# Patient Record
Sex: FEMALE | Race: WHITE | NOT HISPANIC OR LATINO | Employment: FULL TIME | URBAN - METROPOLITAN AREA
[De-identification: names, ages, dates, MRNs, and addresses within clinical notes are randomized per-mention and may not be internally consistent; named-entity substitution may affect disease eponyms.]

---

## 2022-06-28 ENCOUNTER — HOSPITAL ENCOUNTER (EMERGENCY)
Facility: HOSPITAL | Age: 37
Discharge: HOME | End: 2022-06-29
Attending: EMERGENCY MEDICINE
Payer: COMMERCIAL

## 2022-06-28 DIAGNOSIS — R10.9 ABDOMINAL PAIN: Primary | ICD-10-CM

## 2022-06-28 LAB
ALBUMIN SERPL BCP-MCNC: 3.9 G/DL (ref 3.5–5)
ALP SERPL-CCNC: 61 U/L (ref 46–116)
ALT SERPL W P-5'-P-CCNC: 48 U/L (ref 12–78)
ANION GAP SERPL CALCULATED.3IONS-SCNC: 6 MMOL/L (ref 4–13)
AST SERPL W P-5'-P-CCNC: 32 U/L (ref 5–45)
BASOPHILS # BLD AUTO: 0.02 THOUSANDS/ΜL (ref 0–0.1)
BASOPHILS NFR BLD AUTO: 0 % (ref 0–1)
BILIRUB SERPL-MCNC: 0.26 MG/DL (ref 0.2–1)
BILIRUB UR QL STRIP: NEGATIVE
BUN SERPL-MCNC: 18 MG/DL (ref 5–25)
CALCIUM SERPL-MCNC: 9.4 MG/DL (ref 8.3–10.1)
CHLORIDE SERPL-SCNC: 102 MMOL/L (ref 100–108)
CLARITY UR: CLEAR
CO2 SERPL-SCNC: 30 MMOL/L (ref 21–32)
COLOR UR: YELLOW
CREAT SERPL-MCNC: 0.91 MG/DL (ref 0.6–1.3)
EOSINOPHIL # BLD AUTO: 0.14 THOUSAND/ΜL (ref 0–0.61)
EOSINOPHIL NFR BLD AUTO: 2 % (ref 0–6)
ERYTHROCYTE [DISTWIDTH] IN BLOOD BY AUTOMATED COUNT: 13.4 % (ref 11.6–15.1)
EXT PREG TEST URINE: NEGATIVE
EXT. CONTROL ED NAV: NORMAL
GFR SERPL CREATININE-BSD FRML MDRD: 80 ML/MIN/1.73SQ M
GLUCOSE SERPL-MCNC: 90 MG/DL (ref 65–140)
GLUCOSE UR STRIP-MCNC: NEGATIVE MG/DL
HCT VFR BLD AUTO: 41.1 % (ref 34.8–46.1)
HGB BLD-MCNC: 13.2 G/DL (ref 11.5–15.4)
HGB UR QL STRIP.AUTO: NEGATIVE
IMM GRANULOCYTES # BLD AUTO: 0.04 THOUSAND/UL (ref 0–0.2)
IMM GRANULOCYTES NFR BLD AUTO: 1 % (ref 0–2)
KETONES UR STRIP-MCNC: NEGATIVE MG/DL
LEUKOCYTE ESTERASE UR QL STRIP: NEGATIVE
LIPASE SERPL-CCNC: 168 U/L (ref 73–393)
LYMPHOCYTES # BLD AUTO: 3.34 THOUSANDS/ΜL (ref 0.6–4.47)
LYMPHOCYTES NFR BLD AUTO: 43 % (ref 14–44)
MCH RBC QN AUTO: 31.1 PG (ref 26.8–34.3)
MCHC RBC AUTO-ENTMCNC: 32.1 G/DL (ref 31.4–37.4)
MCV RBC AUTO: 97 FL (ref 82–98)
MONOCYTES # BLD AUTO: 0.57 THOUSAND/ΜL (ref 0.17–1.22)
MONOCYTES NFR BLD AUTO: 7 % (ref 4–12)
NEUTROPHILS # BLD AUTO: 3.64 THOUSANDS/ΜL (ref 1.85–7.62)
NEUTS SEG NFR BLD AUTO: 47 % (ref 43–75)
NITRITE UR QL STRIP: NEGATIVE
NRBC BLD AUTO-RTO: 0 /100 WBCS
PH UR STRIP.AUTO: 7 [PH]
PLATELET # BLD AUTO: 344 THOUSANDS/UL (ref 149–390)
PMV BLD AUTO: 10 FL (ref 8.9–12.7)
POTASSIUM SERPL-SCNC: 4.7 MMOL/L (ref 3.5–5.3)
PROT SERPL-MCNC: 8 G/DL (ref 6.4–8.2)
PROT UR STRIP-MCNC: NEGATIVE MG/DL
RBC # BLD AUTO: 4.25 MILLION/UL (ref 3.81–5.12)
SODIUM SERPL-SCNC: 138 MMOL/L (ref 136–145)
SP GR UR STRIP.AUTO: <=1.005 (ref 1–1.03)
UROBILINOGEN UR QL STRIP.AUTO: 0.2 E.U./DL
WBC # BLD AUTO: 7.75 THOUSAND/UL (ref 4.31–10.16)

## 2022-06-28 PROCEDURE — 85025 COMPLETE CBC W/AUTO DIFF WBC: CPT | Performed by: EMERGENCY MEDICINE

## 2022-06-28 PROCEDURE — 81025 URINE PREGNANCY TEST: CPT

## 2022-06-28 PROCEDURE — 96361 HYDRATE IV INFUSION ADD-ON: CPT

## 2022-06-28 PROCEDURE — 99284 EMERGENCY DEPT VISIT MOD MDM: CPT

## 2022-06-28 PROCEDURE — 83690 ASSAY OF LIPASE: CPT | Performed by: EMERGENCY MEDICINE

## 2022-06-28 PROCEDURE — 81003 URINALYSIS AUTO W/O SCOPE: CPT

## 2022-06-28 PROCEDURE — 80053 COMPREHEN METABOLIC PANEL: CPT | Performed by: EMERGENCY MEDICINE

## 2022-06-28 PROCEDURE — 96374 THER/PROPH/DIAG INJ IV PUSH: CPT

## 2022-06-28 PROCEDURE — 36415 COLL VENOUS BLD VENIPUNCTURE: CPT | Performed by: EMERGENCY MEDICINE

## 2022-06-28 RX ORDER — ONDANSETRON 2 MG/ML
4 INJECTION INTRAMUSCULAR; INTRAVENOUS ONCE
Status: COMPLETED | OUTPATIENT
Start: 2022-06-28 | End: 2022-06-28

## 2022-06-28 RX ADMIN — SODIUM CHLORIDE 1000 ML: 0.9 INJECTION, SOLUTION INTRAVENOUS at 23:23

## 2022-06-28 RX ADMIN — ONDANSETRON 4 MG: 2 INJECTION INTRAMUSCULAR; INTRAVENOUS at 23:23

## 2022-06-29 ENCOUNTER — APPOINTMENT (EMERGENCY)
Dept: CT IMAGING | Facility: HOSPITAL | Age: 37
End: 2022-06-29
Payer: COMMERCIAL

## 2022-06-29 ENCOUNTER — APPOINTMENT (EMERGENCY)
Dept: ULTRASOUND IMAGING | Facility: HOSPITAL | Age: 37
End: 2022-06-29
Payer: COMMERCIAL

## 2022-06-29 VITALS
RESPIRATION RATE: 16 BRPM | HEART RATE: 66 BPM | HEIGHT: 61 IN | WEIGHT: 100 LBS | OXYGEN SATURATION: 100 % | BODY MASS INDEX: 18.88 KG/M2 | TEMPERATURE: 98.3 F | SYSTOLIC BLOOD PRESSURE: 123 MMHG | DIASTOLIC BLOOD PRESSURE: 71 MMHG

## 2022-06-29 PROCEDURE — 76856 US EXAM PELVIC COMPLETE: CPT

## 2022-06-29 PROCEDURE — 96361 HYDRATE IV INFUSION ADD-ON: CPT

## 2022-06-29 PROCEDURE — 96375 TX/PRO/DX INJ NEW DRUG ADDON: CPT

## 2022-06-29 PROCEDURE — 99284 EMERGENCY DEPT VISIT MOD MDM: CPT | Performed by: EMERGENCY MEDICINE

## 2022-06-29 PROCEDURE — 74176 CT ABD & PELVIS W/O CONTRAST: CPT

## 2022-06-29 PROCEDURE — 76830 TRANSVAGINAL US NON-OB: CPT

## 2022-06-29 RX ORDER — DICYCLOMINE HCL 20 MG
20 TABLET ORAL 2 TIMES DAILY
Qty: 20 TABLET | Refills: 0 | Status: SHIPPED | OUTPATIENT
Start: 2022-06-29

## 2022-06-29 RX ORDER — KETOROLAC TROMETHAMINE 30 MG/ML
15 INJECTION, SOLUTION INTRAMUSCULAR; INTRAVENOUS ONCE
Status: COMPLETED | OUTPATIENT
Start: 2022-06-29 | End: 2022-06-29

## 2022-06-29 RX ORDER — ONDANSETRON 4 MG/1
4 TABLET, ORALLY DISINTEGRATING ORAL EVERY 6 HOURS PRN
Qty: 20 TABLET | Refills: 0 | Status: SHIPPED | OUTPATIENT
Start: 2022-06-29

## 2022-06-29 RX ORDER — IBUPROFEN 800 MG/1
800 TABLET ORAL 3 TIMES DAILY
Qty: 21 TABLET | Refills: 0 | Status: SHIPPED | OUTPATIENT
Start: 2022-06-29

## 2022-06-29 RX ADMIN — KETOROLAC TROMETHAMINE 15 MG: 30 INJECTION, SOLUTION INTRAMUSCULAR at 02:41

## 2022-07-07 NOTE — ED PROVIDER NOTES
History  Chief Complaint   Patient presents with    Abdominal Pain     LLQ pain, seen in [place], dx with cyst and PID  Pt from [facility] recovery  Reports tonight pain returned  Took ibuprofen w/o relief  24-year-old female presenting to the emergency department for evaluation of abdominal pain  Patient describes about 1-2 weeks of left lower quadrant abdominal pain, persistent since onset, had been seen in [place], was diagnosed with ovarian cyst and PID, patient states she was treated for this  Patient has since moved to this area for substance abuse rehab, states the pain has persisted, associated with some nausea, took some ibuprofen which did not help  Denies diarrhea, denies vomiting, denies vaginal bleeding or discharge  None       Past Medical History:   Diagnosis Date    Anxiety     Depression     Ovarian cyst     PID (pelvic inflammatory disease)     PTSD (post-traumatic stress disorder)        History reviewed  No pertinent surgical history  History reviewed  No pertinent family history  I have reviewed and agree with the history as documented  E-Cigarette/Vaping    E-Cigarette Use Never User      E-Cigarette/Vaping Substances     Social History     Tobacco Use    Smoking status: Never Smoker    Smokeless tobacco: Never Used   Vaping Use    Vaping Use: Never used   Substance Use Topics    Alcohol use: Never    Drug use: Yes     Types: "Crack" cocaine, Cocaine     Comment: IV and smoking, currently at [address]- ast use 6/18/22       Review of Systems   Constitutional: Negative for appetite change, chills, fatigue and fever  HENT: Negative for sneezing and sore throat  Eyes: Negative for visual disturbance  Respiratory: Negative for cough, choking, chest tightness, shortness of breath and wheezing  Cardiovascular: Negative for chest pain and palpitations  Gastrointestinal: Positive for abdominal pain and nausea   Negative for constipation, diarrhea and vomiting  Genitourinary: Negative for difficulty urinating and dysuria  Neurological: Negative for dizziness, weakness, light-headedness, numbness and headaches  All other systems reviewed and are negative  Physical Exam  Physical Exam  Vitals and nursing note reviewed  Constitutional:       General: She is not in acute distress  Appearance: She is well-developed  She is not diaphoretic  HENT:      Head: Normocephalic and atraumatic  Eyes:      Pupils: Pupils are equal, round, and reactive to light  Neck:      Vascular: No JVD  Trachea: No tracheal deviation  Cardiovascular:      Rate and Rhythm: Normal rate and regular rhythm  Heart sounds: Normal heart sounds  No murmur heard  No friction rub  No gallop  Pulmonary:      Effort: Pulmonary effort is normal  No respiratory distress  Breath sounds: Normal breath sounds  No wheezing or rales  Abdominal:      General: Bowel sounds are normal  There is no distension  Palpations: Abdomen is soft  Tenderness: There is abdominal tenderness in the suprapubic area and left lower quadrant  There is no guarding or rebound  Skin:     General: Skin is warm and dry  Coloration: Skin is not pale  Neurological:      Mental Status: She is alert and oriented to person, place, and time  Cranial Nerves: No cranial nerve deficit  Motor: No abnormal muscle tone     Psychiatric:         Behavior: Behavior normal          Vital Signs  ED Triage Vitals   Temperature Pulse Respirations Blood Pressure SpO2   06/28/22 2147 06/28/22 2147 06/28/22 2147 06/28/22 2147 06/28/22 2147   98 1 °F (36 7 °C) 74 16 128/88 99 %      Temp Source Heart Rate Source Patient Position - Orthostatic VS BP Location FiO2 (%)   06/28/22 2147 06/28/22 2228 06/28/22 2147 06/28/22 2147 --   Tympanic Monitor Sitting Left arm       Pain Score       06/29/22 0241       6           Vitals:    06/28/22 2147 06/28/22 2228 06/29/22 0240   BP: 128/88 130/80 123/71   Pulse: 74 76 66   Patient Position - Orthostatic VS: Sitting  Sitting         Visual Acuity      ED Medications  Medications   sodium chloride 0 9 % bolus 1,000 mL (0 mL Intravenous Stopped 6/29/22 0123)   ondansetron (ZOFRAN) injection 4 mg (4 mg Intravenous Given 6/28/22 2323)   ketorolac (TORADOL) injection 15 mg (15 mg Intravenous Given 6/29/22 0241)       Diagnostic Studies  Results Reviewed     Procedure Component Value Units Date/Time    Comprehensive metabolic panel [[id number]] Collected: 06/28/22 2325    Lab Status: Final result Specimen: Blood from Arm, Right Updated: 06/28/22 2346     Sodium 138 mmol/L      Potassium 4 7 mmol/L      Chloride 102 mmol/L      CO2 30 mmol/L      ANION GAP 6 mmol/L      BUN 18 mg/dL      Creatinine 0 91 mg/dL      Glucose 90 mg/dL      Calcium 9 4 mg/dL      AST 32 U/L      ALT 48 U/L      Alkaline Phosphatase 61 U/L      Total Protein 8 0 g/dL      Albumin 3 9 g/dL      Total Bilirubin 0 26 mg/dL      eGFR 80 ml/min/1 73sq m     Narrative:      [place] guidelines for Chronic Kidney Disease (CKD):     Stage 1 with normal or high GFR (GFR > 90 mL/min/1 73 square meters)    Stage 2 Mild CKD (GFR = 60-89 mL/min/1 73 square meters)    Stage 3A Moderate CKD (GFR = 45-59 mL/min/1 73 square meters)    Stage 3B Moderate CKD (GFR = 30-44 mL/min/1 73 square meters)    Stage 4 Severe CKD (GFR = 15-29 mL/min/1 73 square meters)    Stage 5 End Stage CKD (GFR <15 mL/min/1 73 square meters)  Note: GFR calculation is accurate only with a steady state creatinine    Lipase [[id number]]  (Normal) Collected: 06/28/22 2325    Lab Status: Final result Specimen: Blood from Arm, Right Updated: 06/28/22 2346     Lipase 168 u/L     CBC and differential [[id number]] Collected: 06/28/22 2325    Lab Status: Final result Specimen: Blood from Arm, Right Updated: 06/28/22 2330     WBC 7 75 Thousand/uL      RBC 4 25 Million/uL      Hemoglobin 13 2 g/dL Hematocrit 41 1 %      MCV 97 fL      MCH 31 1 pg      MCHC 32 1 g/dL      RDW 13 4 %      MPV 10 0 fL      Platelets 934 Thousands/uL      nRBC 0 /100 WBCs      Neutrophils Relative 47 %      Immat GRANS % 1 %      Lymphocytes Relative 43 %      Monocytes Relative 7 %      Eosinophils Relative 2 %      Basophils Relative 0 %      Neutrophils Absolute 3 64 Thousands/µL      Immature Grans Absolute 0 04 Thousand/uL      Lymphocytes Absolute 3 34 Thousands/µL      Monocytes Absolute 0 57 Thousand/µL      Eosinophils Absolute 0 14 Thousand/µL      Basophils Absolute 0 02 Thousands/µL     POCT pregnancy, urine [[id number]]  (Normal) Resulted: 06/28/22 2300    Lab Status: Final result Specimen: Urine Updated: 06/28/22 2300     EXT PREG TEST UR (Ref: Negative) NEGATIVE     Control VALID    UA w Reflex to Microscopic w Reflex to Culture [[id number]] Collected: 06/28/22 2250    Lab Status: Final result Specimen: Urine, Clean Catch Updated: 06/28/22 2258     Color, UA Yellow     Clarity, UA Clear     Specific Gravity, UA <=1 005     pH, UA 7 0     Leukocytes, UA Negative     Nitrite, UA Negative     Protein, UA Negative mg/dl      Glucose, UA Negative mg/dl      Ketones, UA Negative mg/dl      Urobilinogen, UA 0 2 E U /dl      Bilirubin, UA Negative     Occult Blood, UA Negative                 CT abdomen pelvis wo contrast   Final Result by Ghislaine Funk MD (06/29 9449)      1  Mild wall thickening of the sigmoid colon for which considerations include underdistention or mild colitis  Clinical correlation is recommended  2   Moderate fecal stasis in the colon  No bowel obstruction  3   Small area of tree-in-bud opacity in the lingula  This may reflect the sequela of infection or inflammation  The study was marked in [facility] for immediate notification        Workstation performed: [id number]         US pelvis complete w transvaginal   Final Result by Ghislaine Funk MD (06/29 9110)       Randell Beal sonographic appearance of the uterus and ovaries  Workstation performed: [id number]                    Procedures  Procedures         ED Course                                             MDM  Number of Diagnoses or Management Options  Abdominal pain  Diagnosis management comments: 79-year-old female with abdominal pain, will initiate evaluation with ultrasound to evaluate for possible TOA versus torsion, if ultrasound nondiagnostic, may also obtain CT to evaluate for other acute intra-abdominal pathology, will check labs, UA, U pregnancy, will give pain meds, fluids, antiemetics, and reassess  Patient is feeling better, ultrasound negative, CT suggests possible colitis, reviewed these results with the patient, will give pain meds and instruct patient to follow-up with GI  Patient expresses understanding  Disposition  Final diagnoses:   Abdominal pain     Time reflects when diagnosis was documented in both MDM as applicable and the Disposition within this note     Time User Action Codes Description Comment    6/29/2022  4:00 AM Kylie Rm Add [R10 9] Abdominal pain       ED Disposition     ED Disposition   Discharge    Condition   Stable    Date/Time   Wed Jun 29, 2022  4:00 AM    Comment   Genice Guess discharge to home/self care                 Follow-up Information     Follow up With Specialties Details Why Contact Info Additional [facility]  Please be sure to follow up with GI regarding the findings on your CT [address]  [address]  [place] [address]  [phone] [facility] [address]  [address], [place]arnette Gil[place], [place], [address] [facility] [[email or web address]]          Discharge Medication List as of 6/29/2022  4:06 AM      START taking these medications    Details   dicyclomine (BENTYL) 20 mg tablet Take 1 tablet (20 mg total) by mouth 2 (two) times a day, Starting Wed 6/29/2022, Print      ibuprofen (MOTRIN) 800 mg tablet Take 1 tablet (800 mg total) by mouth 3 (three) times a day, Starting Wed 6/29/2022, Print      ondansetron (Zofran ODT) 4 mg disintegrating tablet Take 1 tablet (4 mg total) by mouth every 6 (six) hours as needed for nausea or vomiting, Starting Wed 6/29/2022, Print             No discharge procedures on file      PDMP Review     None          ED Provider  Electronically Signed by           Thea Epstein MD  07/07/22 7828